# Patient Record
Sex: MALE | URBAN - NONMETROPOLITAN AREA
[De-identification: names, ages, dates, MRNs, and addresses within clinical notes are randomized per-mention and may not be internally consistent; named-entity substitution may affect disease eponyms.]

---

## 2020-10-17 ENCOUNTER — TELEPHONE (OUTPATIENT)
Dept: INTERNAL MEDICINE | Age: 55
End: 2020-10-17

## 2020-10-18 NOTE — TELEPHONE ENCOUNTER
Spoke with Encarnacion Shone at [place] while on call. Patient was admitted yesterday. INR today is 4.4. Patient was started on coumadin during recent stay at [facility] per nurse. She did not have a goal INR listed, but upon questioning, patient does have a mechanical heart valve, will therefore presume INR goal of 2.5-3.5. INR and dosing for past several days were as follows:    10/12/20:  INR 2.4, 7.5 mg  10/13/20: INR 2.5, 10 mg  10/14/20: INR 2.7, 7.5 mg  10/15/20: INR 4.2, coumadin held  10/16/20: INR 3.3, 5 mg    INR today is 4.4. I advised to give 2.5 mg coumadin today and to recheck INR tomorrow. His Hb is 7.6 today, which is relatively stable from Hb of 7.4 on 10/13 and 8.1 on 10/14. Nurse was unsure if he had any bleeding complications or required any blood transfusions during his stay, and unfortunately no additional information from his stay at [facility] is available through care everywhere. She notes patient is currently stable. I advised for patient to be evaluated in ER if shows any signs of bleeding.

## 2020-10-20 ENCOUNTER — OUTSIDE SERVICES (OUTPATIENT)
Dept: FAMILY MEDICINE CLINIC | Age: 55
End: 2020-10-20
Payer: COMMERCIAL

## 2020-10-20 PROCEDURE — 99305 1ST NF CARE MODERATE MDM 35: CPT | Performed by: FAMILY MEDICINE

## 2020-10-20 RX ORDER — PANTOPRAZOLE SODIUM 20 MG/1
20 TABLET, DELAYED RELEASE ORAL DAILY
COMMUNITY

## 2020-10-20 RX ORDER — BISACODYL 10 MG
10 SUPPOSITORY, RECTAL RECTAL PRN
COMMUNITY

## 2020-10-20 RX ORDER — ALLOPURINOL 100 MG/1
50 TABLET ORAL DAILY
COMMUNITY
End: 2020-11-16

## 2020-10-20 RX ORDER — ACETAMINOPHEN 325 MG/1
TABLET ORAL EVERY 6 HOURS PRN
COMMUNITY

## 2020-10-20 RX ORDER — ASPIRIN 81 MG/1
81 TABLET ORAL DAILY
COMMUNITY

## 2020-10-20 RX ORDER — SENNOSIDES 8.6 MG
1 TABLET ORAL 2 TIMES DAILY
COMMUNITY

## 2020-10-20 RX ORDER — DOCUSATE SODIUM 100 MG/1
100 CAPSULE, LIQUID FILLED ORAL 2 TIMES DAILY
COMMUNITY

## 2020-10-21 ENCOUNTER — TELEPHONE (OUTPATIENT)
Dept: INTERNAL MEDICINE | Age: 55
End: 2020-10-21

## 2020-10-21 NOTE — TELEPHONE ENCOUNTER
Received notification from Alexx at [facility] while on call. Patient INR today is 2.0. Confirmed that per previous phone call 10/17/20, patient goal INR is 2.5 - 3.5 due to mechanical valve. Recent coumadin INR and doses have been as follows:    10/12/20: INR 2.4, 7.5 mg  10/13/20: INR 2.5, 10 mg  10/14/20: INR 2.7, 7.5 mg  10/15/20: INR 4.2, coumadin held  10/16/20: INR 3.3, 5 mg  10/17/20: INR 4.4, 2.5 mg  10/18/20: INR 3.9, 2.5 mg  10/19/20: INR 2.5, 5 mg  10/20/20: INR 2.5, 5 mg    Today INR is 2.0. I advised to give coumadin 6 mg once today and to recheck INR tomorrow.  Nurse expresses understanding

## 2020-10-23 NOTE — PROGRESS NOTES
[facility]  Date of Service:  10/20/2020  River Ragsdale  Date of Birth 1965  MRN: [id number]  Code Status----FULL CODE -- Status of AICD in place. HPI:  This is a 28-year-old male patient with a complex cardiac history. He has been transferred from [facility] for additional care after significant open-heart surgery. We are working off records available from discharge with patient filling in some of the other blanks. 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 He had a mechanical aortic valve repair with a 25mm Candelario-Shiley valve and debridement of an annular abbesses in 1983. 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 Most recently then decompensating due to failing valves and has been on a transplant list for a new heart. Somewhat acute admission at [facility] to try to repair the valves yet a third time. On September 25, 2020 he underwent a mechanical Bentall valve of the aortic 27/29ON-X valve and commando procedure mitral valve repair with a 31-33 size mitral valve. This is a complex repair of his aortic and mitral valve and some additional repairs. His hospitalization in surgery was complicated by significant hypotension with the need for pressor agents. He was basically in the OR with an open chest for up to five days by report. He was on ECMO at one point. He suffered significant post-operative anemia and acute kidney injury requiring hemodialysis. He is now being discharged to [facility] for further care.      LABS:  October 19, 2020   Hemoglobin 7.6   Hematocrit 24.6   Sodium 140   Potassium 4.3   Chloride 111   BUN 30   Creatinine 1.8   GFR 39   Calcium  8.6 Protein 6   Albumin  3.4   ALT 25   AST 23   Alkaline Phosphatase 213         Immunization History   Administered Date(s) Administered    Pneumococcal Conjugate 13-valent ([id number]) 03/19/2020    Pneumococcal Polysaccharide ([id number]) 03/25/2019       Allergies   Allergen Reactions    Ambien [Zolpidem Tartrate]     Amiodarone     Mexiletine        Past Medical History:   Diagnosis Date    Anemia     Anterior leaflet of mitral valve attached to septum     Atelectasis     CHF (congestive heart failure) (HCC)     CKD (chronic kidney disease)     Elevated bilirubin     Gout     Hepatomegaly     HTN (hypertension)     ICD (implantable cardioverter-defibrillator) battery depletion     PAF (paroxysmal atrial fibrillation) (HCC)     Reflux esophagitis     S/P AVR (aortic valve replacement)     mechanical    Sinus of Valsalva aneurysm     Smoking history     TR (tricuspid regurgitation)     Valvular cardiomyopathy (HCC)     Ventricular ectopy     VT (ventricular tachycardia) (HCC)        Past Surgical History:   Procedure Laterality Date    AORTIC VALVE REPLACEMENT      CARDIOVERSION      CARPAL TUNNEL RELEASE      TOTAL KNEE ARTHROPLASTY Bilateral     TYMPANOSTOMY TUBE PLACEMENT         Social History     Socioeconomic History    Marital status: Not on file     Spouse name: Not on file    Number of children: Not on file    Years of education: Not on file    Highest education level: Not on file   Occupational History    Not on file   Social Needs    Financial resource strain: Not on file    Food insecurity     Worry: Not on file     Inability: Not on file    Transportation needs     Medical: Not on file     Non-medical: Not on file   Tobacco Use    Smoking status: Not on file   Substance and Sexual Activity    Alcohol use: Not on file    Drug use: Not on file    Sexual activity: Not on file   Lifestyle    Physical activity     Days per week: Not on file     Minutes per session: Not on file    Stress: Not on file   Relationships    Social connections     Talks on phone: Not on file     Gets together: Not on file     Attends [employer] service: Not on file     Active member of club or organization: Not on file     Attends meetings of clubs or organizations: Not on file     Relationship status: Not on file    Intimate partner violence     Fear of current or ex partner: Not on file     Emotionally abused: Not on file     Physically abused: Not on file     Forced sexual activity: Not on file   Other Topics Concern    Not on file   Social History Narrative    Not on file       Current Outpatient Medications   Medication Sig Dispense Refill    Warfarin Sodium (COUMADIN PO) Take by mouth      Polysaccharide Iron Complex (FERREX 150 PO) Take by mouth 2 times daily      Polyethylene Glycol 3350 (MIRALAX PO) Take by mouth daily      allopurinol (ZYLOPRIM) 100 MG tablet Take 50 mg by mouth daily      docusate sodium (COLACE) 100 MG capsule Take 100 mg by mouth 2 times daily      senna (SENOKOT) 8.6 MG TABS tablet Take 1 tablet by mouth 2 times daily      magnesium hydroxide (MILK OF MAGNESIA) 800 MG/5ML suspension Take 30 mLs by mouth daily as needed for Constipation      pantoprazole (PROTONIX) 20 MG tablet Take 20 mg by mouth daily      White Petrolatum-Mineral Oil (LUBRIFRESH P.M. OP) Apply 1 drop to eye every hour OU      acetaminophen (TYLENOL) 325 MG tablet Take by mouth every 6 hours as needed for Pain 1-2 Q 6hrs, PRN      B Complex-C-Folic Acid (NEPHROCAPS PO) Take by mouth daily      aspirin 81 MG EC tablet Take 81 mg by mouth daily      bisacodyl (DULCOLAX) 10 MG suppository Place 10 mg rectally as needed for Constipation      Carbamide Peroxide (DEBROX OT) Place in ear(s) as needed       No current facility-administered medications for this visit. REVIEW OF SYSTEMS:  He generally reports minimal discomfort. All of his surgical sites are healing.  He has no significant open lesions of concern. He does not have any significant cough or shortness of breath to report. He is not reporting any pain in the lower extremities. No significant swelling. Appetite is reported as good. He has had no issues with constipation. PHYSICAL EXAM:  Vital Signs:  Weight 141.2 pounds, his baseline weight on admission was 162 pounds. Respiratory rate 18. Blood pressure 84/56. Temperature 97.5. Pulse 82. SPO2 99% on room air. Constitutional:   Generally, he is somewhat thin body habitus male. Color is a little ashen. He reports feeling well at present. Neck:  Neck supple. He has a right-sided bandage covering his right internal jugular dialysis catheter that has been removed. Site is healing well. Abdominal:  He has several what appear to be trocar sites over the lower abdomen. Soft. Nontender. Nondistended. Active bowel sounds. Musculoskeletal:      Extremities are fairly thin. He has no appreciable edema the lower extremities. Skin:  Sternotomy site is intact with no open areas, and good scab applied on present. He had a left femoral placement for his ECMO and that site is also healing with bruising. Neurological:    He is alert and oriented to person, place. He is a fairly good historian. No visits with results within 1 Month(s) from this visit. Latest known visit with results is:   No results found for any previous visit. ASSESSMENT/PLAN:  1. Aortic valvular disease along with mitral valve disease. Likely bacterial endocarditis at the start. He has had three open heart procedures to repair valves. Most recent procedure was a commando procedure, which is a fairly rare procedure to repair both mitral and aortic valves with some additional tissue grafting at times. They were hoping to buy some time and possibly have more of a permanent fix for his chronic valve issues.  He had been considered heart transplant list patient, but he reports that they might take him off this list if current repair does well longer term. He now has  mechanical aortic and mitral valves. He will be on coumadin for life. He will likely need bridging Lovenox for any kind of operative procedure. He will follow up with cardiovascular surgery and his personal cardiologist Dr. Ebenezer Badillo as scheduled for follow up. 2. History of congestive heart failure. He has a history of valvular cardiomyopathy. His preoperative injection fraction was 51%. His post op ECHO October 11, 2020 showed an ejection fraction of 40%, right RV was mild, 1 + MR, 6-8+ TR, and a complicating small pericardial effusion and at one point had some tamponade. He was left open for several days. 3. History of GERD. he is currently on Protonix 20mg a day to prevent stress ulcers. 4. History of gout. He is currently on a small dose of allopurinol. He has not had any recent gout issues. 5. Status post ICD in place. He also has old wires in place from a temporary pacemaker that were cut and left within the chest which may cause some confusion on further radiology evaluations of the chest and might be a concern for MRIs in the future. He has had no AICD discharges since his discharge from [facility]. 6. History of elevated liver enzymes. He has also had a history of elevated bilirubin while in the hospital. Ultrasound showed some gallbladder sludge. Levels generally came down and they are just watching serially at present. 7. History of atrial fibrillation. He has failed multiple cardioversions in the past. Currently he is more pacemaker dependent. He has anticoagulation in place. He is not on any beta blocker or rate control agents due to significant hypotension in the perioperative and postoperative periods. 8. History of pulmonary hypertension. 9. History of tricuspid regurgitation. 10. History of ventricular tachycardia.  He had at least three runs of V-tach during his hospitalization, all of them short lived up to 20 seconds. He currently has an ICD in place with no shocks. 11. History of right bundle branch block. 12. History of chronic kidney disease. His baseline was around 1.6-1.8. Postoperatively he required some hemodialysis for a few days. Nephrology eventually pulled the catheter and signed off on his case. Recommendation to consider a diuretic after his renal function return to normal. Currently his creatinine is 1.8 which is normal. At this time, I do not see any evidence of volume overload or congestive heart failure, we are going to hold off on the diuretic addition at this time until such time that he seems to be having problems with either volume overload or heart failure. 13. Hyperlipidemia. He is currently not on any active therapy. He has no known coronary disease. His left heart catheterization from March 5, 2019 showed no ischemia. 14. Postoperative anemia. Hemoglobin at 7.6 with hematocrit 24. 6. now trending up. We are going to recheck hemoglobin and hematocrit in one week or as needed for concerns for progression. 15. Malnutrition. Patient admitted at 162 pounds he was just charged at 145 pounds. He is eating well now at least 50% of his meals, his appetite is returning. Consider some supplements such as boost and higher protein snacks. Remainder of chronic health conditions stable and unchanged  Plan as noted above. Will follow up for routine monthly rounds. They will call sooner with any concerns prior. Chapo Chapin am personally transcribing for Tanner Ramos MD 10/23/20 at 2:03 PM EDT. Brisa Nuñez MD, personally performed the services described in this document as transcribed by the , and it is both accurate and complete.     Electronically signed by Tanner Ramos MD on 11/18/20 at 9:33 AM EST

## 2020-11-16 RX ORDER — ALLOPURINOL 100 MG/1
TABLET ORAL
Qty: 15 TABLET | Refills: 3 | Status: SHIPPED | OUTPATIENT
Start: 2020-11-16

## 2020-11-17 RX ORDER — WARFARIN SODIUM 6 MG/1
TABLET ORAL
Qty: 30 TABLET | OUTPATIENT
Start: 2020-11-17